# Patient Record
Sex: FEMALE | Race: WHITE | Employment: FULL TIME | URBAN - METROPOLITAN AREA
[De-identification: names, ages, dates, MRNs, and addresses within clinical notes are randomized per-mention and may not be internally consistent; named-entity substitution may affect disease eponyms.]

---

## 2019-03-18 ENCOUNTER — OFFICE VISIT (OUTPATIENT)
Dept: HEMATOLOGY | Age: 49
End: 2019-03-18

## 2019-03-18 ENCOUNTER — HOSPITAL ENCOUNTER (OUTPATIENT)
Dept: LAB | Age: 49
Discharge: HOME | End: 2019-03-18
Payer: COMMERCIAL

## 2019-03-18 VITALS
HEIGHT: 62 IN | DIASTOLIC BLOOD PRESSURE: 79 MMHG | RESPIRATION RATE: 19 BRPM | SYSTOLIC BLOOD PRESSURE: 114 MMHG | OXYGEN SATURATION: 98 % | WEIGHT: 148 LBS | TEMPERATURE: 98.8 F | HEART RATE: 73 BPM | BODY MASS INDEX: 27.23 KG/M2

## 2019-03-18 DIAGNOSIS — K76.0 NAFLD (NONALCOHOLIC FATTY LIVER DISEASE): ICD-10-CM

## 2019-03-18 DIAGNOSIS — K76.0 NAFLD (NONALCOHOLIC FATTY LIVER DISEASE): Primary | ICD-10-CM

## 2019-03-18 PROBLEM — R25.1 OCCASIONAL TREMORS: Status: ACTIVE | Noted: 2019-03-18

## 2019-03-18 PROBLEM — E78.00 HYPERCHOLESTEROLEMIA: Status: ACTIVE | Noted: 2019-03-18

## 2019-03-18 PROBLEM — R00.0 TACHYCARDIA: Status: ACTIVE | Noted: 2019-03-18

## 2019-03-18 PROBLEM — Q21.12 PFO (PATENT FORAMEN OVALE): Status: ACTIVE | Noted: 2019-03-18

## 2019-03-18 LAB
ALBUMIN SERPL-MCNC: 4.4 G/DL (ref 3.4–5)
ALBUMIN/GLOB SERPL: 1.2 {RATIO} (ref 0.8–1.7)
ALP SERPL-CCNC: 108 U/L (ref 45–117)
ALT SERPL-CCNC: 53 U/L (ref 13–56)
ANION GAP SERPL CALC-SCNC: 6 MMOL/L (ref 3–18)
AST SERPL-CCNC: 34 U/L (ref 15–37)
BASOPHILS # BLD: 0 K/UL (ref 0–0.1)
BASOPHILS NFR BLD: 0 % (ref 0–2)
BILIRUB DIRECT SERPL-MCNC: 0.2 MG/DL (ref 0–0.2)
BILIRUB SERPL-MCNC: 0.7 MG/DL (ref 0.2–1)
BUN SERPL-MCNC: 14 MG/DL (ref 7–18)
BUN/CREAT SERPL: 22 (ref 12–20)
CALCIUM SERPL-MCNC: 9.5 MG/DL (ref 8.5–10.1)
CHLORIDE SERPL-SCNC: 104 MMOL/L (ref 100–108)
CO2 SERPL-SCNC: 30 MMOL/L (ref 21–32)
CREAT SERPL-MCNC: 0.63 MG/DL (ref 0.6–1.3)
DIFFERENTIAL METHOD BLD: ABNORMAL
EOSINOPHIL # BLD: 0.5 K/UL (ref 0–0.4)
EOSINOPHIL NFR BLD: 9 % (ref 0–5)
ERYTHROCYTE [DISTWIDTH] IN BLOOD BY AUTOMATED COUNT: 13.1 % (ref 11.6–14.5)
FERRITIN SERPL-MCNC: 237 NG/ML (ref 8–388)
GLOBULIN SER CALC-MCNC: 3.7 G/DL (ref 2–4)
GLUCOSE SERPL-MCNC: 110 MG/DL (ref 74–99)
HCT VFR BLD AUTO: 43.1 % (ref 35–45)
HGB BLD-MCNC: 13.7 G/DL (ref 12–16)
LYMPHOCYTES # BLD: 1.3 K/UL (ref 0.9–3.6)
LYMPHOCYTES NFR BLD: 25 % (ref 21–52)
MCH RBC QN AUTO: 29.8 PG (ref 24–34)
MCHC RBC AUTO-ENTMCNC: 31.8 G/DL (ref 31–37)
MCV RBC AUTO: 93.7 FL (ref 74–97)
MONOCYTES # BLD: 0.4 K/UL (ref 0.05–1.2)
MONOCYTES NFR BLD: 7 % (ref 3–10)
NEUTS SEG # BLD: 3.1 K/UL (ref 1.8–8)
NEUTS SEG NFR BLD: 59 % (ref 40–73)
PLATELET # BLD AUTO: 297 K/UL (ref 135–420)
PMV BLD AUTO: 10.9 FL (ref 9.2–11.8)
POTASSIUM SERPL-SCNC: 4.4 MMOL/L (ref 3.5–5.5)
PROT SERPL-MCNC: 8.1 G/DL (ref 6.4–8.2)
RBC # BLD AUTO: 4.6 M/UL (ref 4.2–5.3)
SODIUM SERPL-SCNC: 140 MMOL/L (ref 136–145)
WBC # BLD AUTO: 5.3 K/UL (ref 4.6–13.2)

## 2019-03-18 PROCEDURE — 81256 HFE GENE: CPT

## 2019-03-18 PROCEDURE — 85025 COMPLETE CBC W/AUTO DIFF WBC: CPT

## 2019-03-18 PROCEDURE — 80048 BASIC METABOLIC PNL TOTAL CA: CPT

## 2019-03-18 PROCEDURE — 36415 COLL VENOUS BLD VENIPUNCTURE: CPT

## 2019-03-18 PROCEDURE — 82728 ASSAY OF FERRITIN: CPT

## 2019-03-18 PROCEDURE — 80076 HEPATIC FUNCTION PANEL: CPT

## 2019-03-18 RX ORDER — FLUTICASONE PROPIONATE 50 MCG
2 SPRAY, SUSPENSION (ML) NASAL DAILY
COMMUNITY

## 2019-03-18 RX ORDER — BISOPROLOL FUMARATE 10 MG/1
10 TABLET ORAL DAILY
COMMUNITY

## 2019-03-18 RX ORDER — BISMUTH SUBSALICYLATE 262 MG
1 TABLET,CHEWABLE ORAL DAILY
COMMUNITY

## 2019-03-18 RX ORDER — ATORVASTATIN CALCIUM 20 MG/1
40 TABLET, FILM COATED ORAL DAILY
COMMUNITY

## 2019-03-18 RX ORDER — ASPIRIN 81 MG/1
TABLET ORAL DAILY
COMMUNITY

## 2019-03-18 RX ORDER — AZELASTINE HCL 205.5 UG/1
SPRAY NASAL 2 TIMES DAILY
COMMUNITY

## 2019-03-18 NOTE — PROGRESS NOTES
[address], [place], [address], [place], [place]       AMANDA Lozoya PA-C [facility] Spikes, [facility]   AMANDA Loredo NP [address] [facility] [address]    at [address], [address] [place] [address], [facility] 22.    [phone]    FAX: [address] [place] [address]    at [address], [address], [address] [place] [address],[address]    [address] [place] [address] Street: [phone]           Patient Care Team:  Trevon Lopez MD as PCP - General (Internal Medicine)  Trevon Lopez MD as Consulting Provider (Internal Medicine)  Irving Mchugh MD as Consulting Provider (Cardiology)  Jose Lake MD as Consulting Provider (Obstetrics & Gynecology)  Tori Bosch MD as Consulting Provider      Problem List  Never Reviewed          Codes Class Noted    Fatty liver ICD-10-CM: K76.0  ICD-9-CM: 571.8  3/18/2019        Hypercholesterolemia ICD-10-CM: E78.00  ICD-9-CM: 272.0  3/18/2019        Tachycardia ICD-10-CM: R00.0  ICD-9-CM: 785.0  3/18/2019        PFO (patent foramen ovale) ICD-10-CM: Q21.1  ICD-9-CM: 745.5  3/18/2019        Occasional tremors ICD-10-CM: R25.1  ICD-9-CM: 781.0  3/18/2019                The clinicians listed above have asked me to see Nghia Dunn in consultation regarding suspected fatty liver disease and its management. All medical records sent by the referring physicians were reviewed including imaging studies and pathology. The patient is a 46 y.o.  female who is suspected to have fatty liver disease based upon ultrasound. Serologic evaluation for markers of chronic liver disease was positive for KRISTIE. Ferritin slightly elevated. The most recent imaging of the liver was with Ultrasound in 06/2018.  Results suggest fatty liver disease. No liver mass lesions noted. An assessment of liver fibrosis with biopsy or elastography has not been performed. The patient had not started any new medications within 3 months preceding the elevation in liver chemistries. The patient has no complaints which can be attributed to liver disease. The patient completes all daily activities without any functional limitations. The patient has not experienced fatigue, fevers, chills, shortness of breath, chest pain, pain in the right side over the liver, diffuse abdominal pain, nausea, vomiting, constipation, diarrhrea, dry eyes, dry mouth, arthralgias, myalgias, yellowing of the eyes or skin, itching, dark urine, problems concentrating, swelling of the abdomen, swelling of the lower extremities, hematemesis, or hematochezia. ASSESSMENT AND PLAN:  Fatty liver  The diagnosis is based upon imaging and serologic studies that are negative for other causes of chronic liver disease except a positive KRISTIE. The histologic severity has not been defined. If the patient looses 20% of current body weight, all steatosis will have resolved. Once all steatosis has resolved all inflammation will resolve. Then all fibrosis will gradually resolve and the liver could eventually be normal.    The most recent laboratory studies indicate that the liver transaminases are normal, alkaline phosphatase is normal, tests of hepatic synthetic and metabolic function are normal, and the platelet count is normal.  She did have a slightly elevated ferritin. Based upon laboratory studies and imaging, the patient does not appear to have significant liver injury. Will perform laboratory testing to monitor liver function and degree of liver injury. This included BMP, hepatic panel, CBC with platelet count. Serologic testing for causes of chronic liver disease were negative for HCV, HBV, ASMA, AMA. Positive KRISTIE.    Serologic testing was positive for KRISTIE and slightly elevated ferritin. She has a positive KRISTIE. This is loosely associated with underlying autoimmune disease. However, there is no elevation in liver chemistries, so if this is autoimmune, there is nothing to treat at this time. We will follow liver enzymes every 6 months for the next couple of years to assess for activation of autoimmune hepatitis. She reports that her father is [place] Because of this and the slight elevation in ferritin, a Hemochromatosis gene mutation test was ordered. She may be a carrier of the mutation. Will perform imaging of the liver with ultrasound. The need to perform an assessment of liver fibrosis was discussed with the patient. Elastography  can assess liver fibrosis and determine if a patient has advanced fibrosis or cirrhosis without the need for liver biopsy. This can also be performed with ultrasound. This will be performed along with the ultrasound. Only a liver biopsy can confirm if the patient does have fatty liver and differentiate NAFL from DREW. The treatment is the same: weight reduction. If the liver biopsy demonstrates DREW the patient could be eligible for enrollment into clinical trials for treatment of DREW. The need to perform a liver biopsy to help determine the cause and severity of the liver test abnormalities was discussed. The risks of performing the liver biopsy including pain, puncture of the lung, gallbladder, intestine or kidney and bleeding were discussed. Will defer liver biopsy for now. There is no reason to perform liver biopsy at this time. Liver chemistries will be monitored. If the liver enzymes remain persistently elevated over the next 1-2 years a liver biopsy should be performed to ensure there is no ongoing chronic liver disease. There is currently no FDA approved medical treatment for fatty liver, NALFD or DREW.     The patient was counseled regarding diet and exercise to achieve weight loss. The best diet for patients with fatty liver is one very low in carbohydrates and enriched with protein such as an Dianne's program.      The patient was told to consume any food products and drinks containing fructose as this enhances hepatic fat synthesis. There is no medication or vitamin supplements that we advocate for DREW. Using glitazones in patients without diabetes mellitus has been shown to reduce fat content in the liver but has no effect on fibrosis and is associated with weight gain. Vitamin E has also been used but the data is not very good and most experts no longer advocate this. We will continue to monitor the patient at periodic intervals. If weight loss is successful the fat will resolve from the liver and liver enzymes should return to the normal range. We would then repeat the ultrasound and Fibroscan to see if this also returns to normal.      If liver enzymes do not return to normal with weight reduction then additional evaluation may be necessary to determine if the elevated liver enzymes is due to another etiology. Screening for Hepatocellular Carcinoma  HCC screening is not necessary if the patient has no evidence of cirrhosis. AFP was ordered today and ultrasound will be scheduled. Treatment of other medical problems in patients with chronic liver disease  There are no contraindications for the patient to take most medications that are necessary for treatment of other medical issues. The patient can take the following medications as these will not impact the patient's current liver disease. Any medications utilized for treatment of DM  Statins to treat hypercholesterolemia    The patient does not comsume alcohol on a daily basis. Normal doses of acetaminophen, as recommended on the label of the bottle, are not hepatotoxic except in the setting of daily alcohol use, even in patients with cirrhosis and can be utilized for pain.     Counseling for alcohol in patients with chronic liver disease  The patient was counseled regarding alcohol consumption and the effect of alcohol on chronic liver disease. The patient does not consume any significant amount of alcohol. The patient was reminded that alcohol can cause fatty liver. The patient does not have a chronic liver disease and does not have to be abstinent from alcohol. Vaccinations   Vaccination for viral hepatitis A and B is recommended since the patient has no serologic evidence of previous exposure or vaccination with immunity. TwinRix prescription was provided today. Since the patient does not have a chronic liver disease which can lead to liver injury screening for HAV and HBV is not needed. Routine vaccinations against other bacterial and viral agents can be performed as indicated. Annual flu vaccination should be administered if indicated. ALLERGIES  Allergies   Allergen Reactions    Clinoril [Sulindac] Hives    Codeine Itching    Erythromycin Itching    Penicillin G Shortness of Breath    Sulfa (Sulfonamide Antibiotics) Nausea and Vomiting    Tramadol Nausea Only       MEDICATIONS  Current Outpatient Medications   Medication Sig    bisoprolol (ZEBETA) 10 mg tablet Take 10 mg by mouth daily.  atorvastatin (LIPITOR) 20 mg tablet Take  by mouth daily.  aspirin delayed-release 81 mg tablet Take  by mouth daily.  multivitamin (ONE A DAY) tablet Take 1 Tab by mouth daily.  azelastine (ASTEPRO) 0.15 % (205.5 mcg) two (2) times a day.  fluticasone propionate (FLONASE) 50 mcg/actuation nasal spray 2 Sprays by Both Nostrils route daily. No current facility-administered medications for this visit. SYSTEM REVIEW NOT RELATED TO LIVER DISEASE OR REVIEWED ABOVE:  Constitution systems: Negative for fever, chills, weight gain, weight loss. Eyes: Negative for visual changes. ENT: Negative for sore throat, painful swallowing.    Respiratory: Negative for cough, hemoptysis, SOB. Cardiology: Negative for chest pain, palpitations. GI:  Negative for constipation or diarrhea. : Negative for urinary frequency, dysuria, hematuria, nocturia. Skin: Negative for rash. Hematology: Negative for easy bruising, blood clots. Musculo-skelatal: Negative for back pain, muscle pain, weakness. Neurologic: Negative for headaches, dizziness, vertigo, memory problems not related to HE. Psychology: Negative for anxiety, depression. FAMILY HISTORY:  The patient is adopted and does not know any of his family history. The father  at 53 y/o from [address] [facility]. The mother is alive and has the following chronic diseases: heart disease. She is 81 y/o. There is no family history of liver disease. SOCIAL HISTORY:  The patient is . The patient has 1 child. The patient has never used tobacco products. The patient has never consumed significant amounts of alcohol. The patient consumes 2 to 3 alcoholic beverages per week. The patient currently works full time as [employer]. PHYSICAL EXAMINATION:  VS: per nursing note  General: No acute distress. Eyes: Sclera anicteric. ENT: No oral lesions. Thyroid normal.  Nodes: No adenopathy. Skin: No spider angiomata. No jaundice. No palmar erythema. Respiratory: Lungs clear to auscultation. Cardiovascular: Regular heart rate. No murmurs. No JVD. Abdomen: Soft non-tender, liver size normal to percussion/palpation. Spleen not palpable. No obvious ascites. Extremities: No edema. No muscle wasting. No gross arthritic changes. Neurologic: Alert and oriented. Cranial nerves grossly intact. No asterixis. LABORATORY STUDIES:  From 2019  AST/ ALT/ ALP/ T. BILI/ ALB:  26/ 30/ 100/ 0.7/ 4.8     SEROLOGIES:  From 2019:  AMA (-), ASMA (-), RA (-).   KRISTIE (+)    LIVER HISTOLOGY:  Not available or performed    ENDOSCOPIC PROCEDURES:  Not available or performed    RADIOLOGY:  Not available or performed    OTHER TESTING:  Not available or performed    FOLLOW-UP:  All of the issues listed above in the Assessment and Plan were discussed with the patient. All questions were answered. The patient expressed a clear understanding of the above. [address] [facility] in 6 months.          THERESE Harrison  Liver [place] [address], [address] [place] [address]   [phone]

## 2019-03-18 NOTE — PROGRESS NOTES
1. Have you been to the ER, urgent care clinic since your last visit? Hospitalized since your last visit? No    2. Have you seen or consulted any other health care providers outside of the [address] [place] [address] since your last visit? Include any pap smears or colon screening.  Yes, Dr. Shanice Andino

## 2019-03-21 LAB — HFE GENE MUT ANL BLD/T: NORMAL

## 2019-03-22 ENCOUNTER — TELEPHONE (OUTPATIENT)
Dept: HEMATOLOGY | Age: 49
End: 2019-03-22

## 2019-03-22 NOTE — TELEPHONE ENCOUNTER
Updated on current labs. All are WNL. 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 Follow up as scheduled.

## 2019-03-28 ENCOUNTER — HOSPITAL ENCOUNTER (OUTPATIENT)
Dept: ULTRASOUND IMAGING | Age: 49
Discharge: HOME | End: 2019-03-28
Payer: COMMERCIAL

## 2019-03-28 DIAGNOSIS — K76.0 NAFLD (NONALCOHOLIC FATTY LIVER DISEASE): ICD-10-CM

## 2019-03-28 PROCEDURE — 76981 USE PARENCHYMA: CPT

## 2022-02-16 ENCOUNTER — ANESTHESIA (OUTPATIENT)
Dept: SURGERY | Age: 52
DRG: 514 | End: 2022-02-16
Payer: COMMERCIAL

## 2022-02-16 ENCOUNTER — ANESTHESIA EVENT (OUTPATIENT)
Dept: SURGERY | Age: 52
DRG: 514 | End: 2022-02-16
Payer: COMMERCIAL

## 2022-02-16 ENCOUNTER — HOSPITAL ENCOUNTER (INPATIENT)
Age: 52
LOS: 1 days | Discharge: HOME | DRG: 514 | End: 2022-02-17
Attending: ORTHOPAEDIC SURGERY | Admitting: ORTHOPAEDIC SURGERY
Payer: COMMERCIAL

## 2022-02-16 PROBLEM — M00.9 SEPTIC ARTHRITIS OF HAND, RIGHT (HCC): Status: ACTIVE | Noted: 2022-02-16

## 2022-02-16 LAB
ALBUMIN SERPL-MCNC: 4.2 G/DL (ref 3.4–5)
ALBUMIN/GLOB SERPL: 1.2 {RATIO} (ref 0.8–1.7)
ALP SERPL-CCNC: 93 U/L (ref 45–117)
ALT SERPL-CCNC: 45 U/L (ref 13–56)
ANION GAP SERPL CALC-SCNC: 5 MMOL/L (ref 3–18)
AST SERPL-CCNC: 27 U/L (ref 10–38)
BASOPHILS # BLD: 0.1 K/UL (ref 0–0.1)
BASOPHILS NFR BLD: 1 % (ref 0–2)
BILIRUB SERPL-MCNC: 1.1 MG/DL (ref 0.2–1)
BUN SERPL-MCNC: 11 MG/DL (ref 7–18)
BUN/CREAT SERPL: 17 (ref 12–20)
CALCIUM SERPL-MCNC: 10.2 MG/DL (ref 8.5–10.1)
CHLORIDE SERPL-SCNC: 108 MMOL/L (ref 100–111)
CO2 SERPL-SCNC: 29 MMOL/L (ref 21–32)
COVID-19 RAPID TEST, COVR: NOT DETECTED
CREAT SERPL-MCNC: 0.65 MG/DL (ref 0.6–1.3)
DIFFERENTIAL METHOD BLD: NORMAL
EOSINOPHIL # BLD: 0.4 K/UL (ref 0–0.4)
EOSINOPHIL NFR BLD: 5 % (ref 0–5)
ERYTHROCYTE [DISTWIDTH] IN BLOOD BY AUTOMATED COUNT: 11.9 % (ref 11.6–14.5)
GLOBULIN SER CALC-MCNC: 3.4 G/DL (ref 2–4)
GLUCOSE SERPL-MCNC: 93 MG/DL (ref 74–99)
HCG UR QL: NEGATIVE
HCT VFR BLD AUTO: 43.6 % (ref 35–45)
HGB BLD-MCNC: 14.2 G/DL (ref 12–16)
IMM GRANULOCYTES # BLD AUTO: 0 K/UL (ref 0–0.04)
IMM GRANULOCYTES NFR BLD AUTO: 0 % (ref 0–0.5)
LYMPHOCYTES # BLD: 3.1 K/UL (ref 0.9–3.6)
LYMPHOCYTES NFR BLD: 33 % (ref 21–52)
MCH RBC QN AUTO: 29.4 PG (ref 24–34)
MCHC RBC AUTO-ENTMCNC: 32.6 G/DL (ref 31–37)
MCV RBC AUTO: 90.3 FL (ref 78–100)
MONOCYTES # BLD: 0.8 K/UL (ref 0.05–1.2)
MONOCYTES NFR BLD: 8 % (ref 3–10)
NEUTS SEG # BLD: 4.9 K/UL (ref 1.8–8)
NEUTS SEG NFR BLD: 53 % (ref 40–73)
NRBC # BLD: 0 K/UL (ref 0–0.01)
NRBC BLD-RTO: 0 PER 100 WBC
PLATELET # BLD AUTO: 271 K/UL (ref 135–420)
PMV BLD AUTO: 10.7 FL (ref 9.2–11.8)
POTASSIUM SERPL-SCNC: 5 MMOL/L (ref 3.5–5.5)
PROT SERPL-MCNC: 7.6 G/DL (ref 6.4–8.2)
RBC # BLD AUTO: 4.83 M/UL (ref 4.2–5.3)
SODIUM SERPL-SCNC: 142 MMOL/L (ref 136–145)
SOURCE, COVRS: NORMAL
WBC # BLD AUTO: 9.2 K/UL (ref 4.6–13.2)

## 2022-02-16 PROCEDURE — 2709999900 HC NON-CHARGEABLE SUPPLY: Performed by: ORTHOPAEDIC SURGERY

## 2022-02-16 PROCEDURE — 80053 COMPREHEN METABOLIC PANEL: CPT

## 2022-02-16 PROCEDURE — 36415 COLL VENOUS BLD VENIPUNCTURE: CPT

## 2022-02-16 PROCEDURE — 87635 SARS-COV-2 COVID-19 AMP PRB: CPT

## 2022-02-16 PROCEDURE — 77030000032 HC CUF TRNQT ZIMM -B: Performed by: ORTHOPAEDIC SURGERY

## 2022-02-16 PROCEDURE — 77030020782 HC GWN BAIR PAWS FLX 3M -B: Performed by: ORTHOPAEDIC SURGERY

## 2022-02-16 PROCEDURE — 76060000032 HC ANESTHESIA 0.5 TO 1 HR: Performed by: ORTHOPAEDIC SURGERY

## 2022-02-16 PROCEDURE — 74011250636 HC RX REV CODE- 250/636: Performed by: ANESTHESIOLOGY

## 2022-02-16 PROCEDURE — 77030002916 HC SUT ETHLN J&J -A: Performed by: ORTHOPAEDIC SURGERY

## 2022-02-16 PROCEDURE — 77030040361 HC SLV COMPR DVT MDII -B: Performed by: ORTHOPAEDIC SURGERY

## 2022-02-16 PROCEDURE — 74011000258 HC RX REV CODE- 258: Performed by: ORTHOPAEDIC SURGERY

## 2022-02-16 PROCEDURE — 76210000063 HC OR PH I REC FIRST 0.5 HR: Performed by: ORTHOPAEDIC SURGERY

## 2022-02-16 PROCEDURE — 87102 FUNGUS ISOLATION CULTURE: CPT

## 2022-02-16 PROCEDURE — 85025 COMPLETE CBC W/AUTO DIFF WBC: CPT

## 2022-02-16 PROCEDURE — 87075 CULTR BACTERIA EXCEPT BLOOD: CPT

## 2022-02-16 PROCEDURE — 76010000138 HC OR TIME 0.5 TO 1 HR: Performed by: ORTHOPAEDIC SURGERY

## 2022-02-16 PROCEDURE — 65270000029 HC RM PRIVATE

## 2022-02-16 PROCEDURE — 74011250636 HC RX REV CODE- 250/636: Performed by: ORTHOPAEDIC SURGERY

## 2022-02-16 PROCEDURE — 0L970ZZ DRAINAGE OF RIGHT HAND TENDON, OPEN APPROACH: ICD-10-PCS | Performed by: ORTHOPAEDIC SURGERY

## 2022-02-16 PROCEDURE — 87116 MYCOBACTERIA CULTURE: CPT

## 2022-02-16 PROCEDURE — 74011250637 HC RX REV CODE- 250/637: Performed by: ORTHOPAEDIC SURGERY

## 2022-02-16 PROCEDURE — 87205 SMEAR GRAM STAIN: CPT

## 2022-02-16 PROCEDURE — 74011000250 HC RX REV CODE- 250: Performed by: ANESTHESIOLOGY

## 2022-02-16 PROCEDURE — 87186 SC STD MICRODIL/AGAR DIL: CPT

## 2022-02-16 PROCEDURE — 0LD70ZZ EXTRACTION OF RIGHT HAND TENDON, OPEN APPROACH: ICD-10-PCS | Performed by: ORTHOPAEDIC SURGERY

## 2022-02-16 PROCEDURE — 74011000250 HC RX REV CODE- 250: Performed by: ORTHOPAEDIC SURGERY

## 2022-02-16 PROCEDURE — 77030027138 HC INCENT SPIROMETER -A

## 2022-02-16 PROCEDURE — 81025 URINE PREGNANCY TEST: CPT

## 2022-02-16 PROCEDURE — 87077 CULTURE AEROBIC IDENTIFY: CPT

## 2022-02-16 RX ORDER — ONDANSETRON 2 MG/ML
4 INJECTION INTRAMUSCULAR; INTRAVENOUS ONCE
Status: DISCONTINUED | OUTPATIENT
Start: 2022-02-16 | End: 2022-02-16 | Stop reason: HOSPADM

## 2022-02-16 RX ORDER — MAGNESIUM SULFATE 100 %
4 CRYSTALS MISCELLANEOUS AS NEEDED
Status: DISCONTINUED | OUTPATIENT
Start: 2022-02-16 | End: 2022-02-16 | Stop reason: HOSPADM

## 2022-02-16 RX ORDER — ATORVASTATIN CALCIUM 20 MG/1
20 TABLET, FILM COATED ORAL DAILY
Status: DISCONTINUED | OUTPATIENT
Start: 2022-02-17 | End: 2022-02-17

## 2022-02-16 RX ORDER — AZELASTINE HCL 205.5 UG/1
2 SPRAY NASAL 2 TIMES DAILY
Status: DISCONTINUED | OUTPATIENT
Start: 2022-02-17 | End: 2022-02-17 | Stop reason: HOSPADM

## 2022-02-16 RX ORDER — LIDOCAINE HYDROCHLORIDE 20 MG/ML
INJECTION, SOLUTION EPIDURAL; INFILTRATION; INTRACAUDAL; PERINEURAL AS NEEDED
Status: DISCONTINUED | OUTPATIENT
Start: 2022-02-16 | End: 2022-02-16 | Stop reason: HOSPADM

## 2022-02-16 RX ORDER — ACETAMINOPHEN 500 MG
1000 TABLET ORAL
Status: DISCONTINUED | OUTPATIENT
Start: 2022-02-16 | End: 2022-02-17 | Stop reason: HOSPADM

## 2022-02-16 RX ORDER — MIDAZOLAM HYDROCHLORIDE 1 MG/ML
INJECTION, SOLUTION INTRAMUSCULAR; INTRAVENOUS AS NEEDED
Status: DISCONTINUED | OUTPATIENT
Start: 2022-02-16 | End: 2022-02-16 | Stop reason: HOSPADM

## 2022-02-16 RX ORDER — SODIUM CHLORIDE 0.9 % (FLUSH) 0.9 %
5-40 SYRINGE (ML) INJECTION AS NEEDED
Status: DISCONTINUED | OUTPATIENT
Start: 2022-02-16 | End: 2022-02-16 | Stop reason: HOSPADM

## 2022-02-16 RX ORDER — SODIUM CHLORIDE 0.9 % (FLUSH) 0.9 %
5-40 SYRINGE (ML) INJECTION EVERY 8 HOURS
Status: DISCONTINUED | OUTPATIENT
Start: 2022-02-16 | End: 2022-02-16 | Stop reason: HOSPADM

## 2022-02-16 RX ORDER — LEVOFLOXACIN 5 MG/ML
500 INJECTION, SOLUTION INTRAVENOUS EVERY 24 HOURS
Status: DISCONTINUED | OUTPATIENT
Start: 2022-02-16 | End: 2022-02-17 | Stop reason: HOSPADM

## 2022-02-16 RX ORDER — PROPOFOL 10 MG/ML
INJECTION, EMULSION INTRAVENOUS AS NEEDED
Status: DISCONTINUED | OUTPATIENT
Start: 2022-02-16 | End: 2022-02-16 | Stop reason: HOSPADM

## 2022-02-16 RX ORDER — INSULIN LISPRO 100 [IU]/ML
INJECTION, SOLUTION INTRAVENOUS; SUBCUTANEOUS ONCE
Status: DISCONTINUED | OUTPATIENT
Start: 2022-02-16 | End: 2022-02-16 | Stop reason: HOSPADM

## 2022-02-16 RX ORDER — NALOXONE HYDROCHLORIDE 0.4 MG/ML
0.1 INJECTION, SOLUTION INTRAMUSCULAR; INTRAVENOUS; SUBCUTANEOUS AS NEEDED
Status: DISCONTINUED | OUTPATIENT
Start: 2022-02-16 | End: 2022-02-16 | Stop reason: HOSPADM

## 2022-02-16 RX ORDER — DEXTROSE MONOHYDRATE 100 MG/ML
0-250 INJECTION, SOLUTION INTRAVENOUS AS NEEDED
Status: DISCONTINUED | OUTPATIENT
Start: 2022-02-16 | End: 2022-02-16 | Stop reason: HOSPADM

## 2022-02-16 RX ORDER — OXYCODONE HYDROCHLORIDE 5 MG/1
5-10 TABLET ORAL
Status: DISCONTINUED | OUTPATIENT
Start: 2022-02-16 | End: 2022-02-17 | Stop reason: HOSPADM

## 2022-02-16 RX ORDER — FENTANYL CITRATE 50 UG/ML
25 INJECTION, SOLUTION INTRAMUSCULAR; INTRAVENOUS AS NEEDED
Status: DISCONTINUED | OUTPATIENT
Start: 2022-02-16 | End: 2022-02-16 | Stop reason: HOSPADM

## 2022-02-16 RX ORDER — ONDANSETRON 2 MG/ML
INJECTION INTRAMUSCULAR; INTRAVENOUS AS NEEDED
Status: DISCONTINUED | OUTPATIENT
Start: 2022-02-16 | End: 2022-02-16 | Stop reason: HOSPADM

## 2022-02-16 RX ORDER — METOPROLOL SUCCINATE 100 MG/1
100 TABLET, EXTENDED RELEASE ORAL DAILY
Status: DISCONTINUED | OUTPATIENT
Start: 2022-02-17 | End: 2022-02-17 | Stop reason: HOSPADM

## 2022-02-16 RX ORDER — SODIUM CHLORIDE, SODIUM LACTATE, POTASSIUM CHLORIDE, CALCIUM CHLORIDE 600; 310; 30; 20 MG/100ML; MG/100ML; MG/100ML; MG/100ML
100 INJECTION, SOLUTION INTRAVENOUS CONTINUOUS
Status: DISCONTINUED | OUTPATIENT
Start: 2022-02-16 | End: 2022-02-16 | Stop reason: HOSPADM

## 2022-02-16 RX ORDER — DOXYCYCLINE 100 MG/1
100 CAPSULE ORAL EVERY 12 HOURS
COMMUNITY
Start: 2022-02-15 | End: 2022-02-22

## 2022-02-16 RX ORDER — CLINDAMYCIN HYDROCHLORIDE 150 MG/1
300 CAPSULE ORAL EVERY 8 HOURS
COMMUNITY
Start: 2022-02-15 | End: 2022-02-22

## 2022-02-16 RX ORDER — SODIUM CHLORIDE, SODIUM LACTATE, POTASSIUM CHLORIDE, CALCIUM CHLORIDE 600; 310; 30; 20 MG/100ML; MG/100ML; MG/100ML; MG/100ML
125 INJECTION, SOLUTION INTRAVENOUS CONTINUOUS
Status: DISCONTINUED | OUTPATIENT
Start: 2022-02-16 | End: 2022-02-17 | Stop reason: HOSPADM

## 2022-02-16 RX ORDER — DIPHENHYDRAMINE HCL 25 MG
25 CAPSULE ORAL
Status: DISCONTINUED | OUTPATIENT
Start: 2022-02-16 | End: 2022-02-17 | Stop reason: HOSPADM

## 2022-02-16 RX ORDER — FENTANYL CITRATE 50 UG/ML
INJECTION, SOLUTION INTRAMUSCULAR; INTRAVENOUS AS NEEDED
Status: DISCONTINUED | OUTPATIENT
Start: 2022-02-16 | End: 2022-02-16 | Stop reason: HOSPADM

## 2022-02-16 RX ORDER — ONDANSETRON 4 MG/1
8 TABLET, ORALLY DISINTEGRATING ORAL
Status: DISCONTINUED | OUTPATIENT
Start: 2022-02-16 | End: 2022-02-17 | Stop reason: HOSPADM

## 2022-02-16 RX ADMIN — ONDANSETRON HYDROCHLORIDE 4 MG: 2 INJECTION INTRAMUSCULAR; INTRAVENOUS at 20:39

## 2022-02-16 RX ADMIN — CEFTRIAXONE SODIUM 2 G: 2 INJECTION, POWDER, FOR SOLUTION INTRAMUSCULAR; INTRAVENOUS at 20:26

## 2022-02-16 RX ADMIN — PROPOFOL 50 MG: 10 INJECTION, EMULSION INTRAVENOUS at 20:22

## 2022-02-16 RX ADMIN — MIDAZOLAM 2 MG: 1 INJECTION INTRAMUSCULAR; INTRAVENOUS at 19:51

## 2022-02-16 RX ADMIN — FENTANYL CITRATE 25 MCG: 50 INJECTION, SOLUTION INTRAMUSCULAR; INTRAVENOUS at 20:23

## 2022-02-16 RX ADMIN — PROPOFOL 50 MG: 10 INJECTION, EMULSION INTRAVENOUS at 20:27

## 2022-02-16 RX ADMIN — PROPOFOL 50 MG: 10 INJECTION, EMULSION INTRAVENOUS at 20:11

## 2022-02-16 RX ADMIN — SODIUM CHLORIDE, SODIUM LACTATE, POTASSIUM CHLORIDE, AND CALCIUM CHLORIDE: 600; 310; 30; 20 INJECTION, SOLUTION INTRAVENOUS at 20:35

## 2022-02-16 RX ADMIN — PROPOFOL 50 MG: 10 INJECTION, EMULSION INTRAVENOUS at 20:18

## 2022-02-16 RX ADMIN — FENTANYL CITRATE 25 MCG: 50 INJECTION, SOLUTION INTRAMUSCULAR; INTRAVENOUS at 20:18

## 2022-02-16 RX ADMIN — PROPOFOL 50 MG: 10 INJECTION, EMULSION INTRAVENOUS at 20:00

## 2022-02-16 RX ADMIN — FENTANYL CITRATE 50 MCG: 50 INJECTION, SOLUTION INTRAMUSCULAR; INTRAVENOUS at 19:56

## 2022-02-16 RX ADMIN — SODIUM CHLORIDE, SODIUM LACTATE, POTASSIUM CHLORIDE, AND CALCIUM CHLORIDE 125 ML/HR: 600; 310; 30; 20 INJECTION, SOLUTION INTRAVENOUS at 19:32

## 2022-02-16 RX ADMIN — SODIUM CHLORIDE, SODIUM LACTATE, POTASSIUM CHLORIDE, AND CALCIUM CHLORIDE 125 ML/HR: 600; 310; 30; 20 INJECTION, SOLUTION INTRAVENOUS at 16:55

## 2022-02-16 RX ADMIN — PROPOFOL 50 MG: 10 INJECTION, EMULSION INTRAVENOUS at 20:05

## 2022-02-16 RX ADMIN — LIDOCAINE HYDROCHLORIDE 60 MG: 20 INJECTION, SOLUTION INTRAVENOUS at 19:55

## 2022-02-16 RX ADMIN — LEVOFLOXACIN 500 MG: 5 INJECTION, SOLUTION INTRAVENOUS at 22:04

## 2022-02-16 RX ADMIN — ONDANSETRON 8 MG: 4 TABLET, ORALLY DISINTEGRATING ORAL at 22:03

## 2022-02-16 RX ADMIN — PROPOFOL 50 MG: 10 INJECTION, EMULSION INTRAVENOUS at 20:33

## 2022-02-16 NOTE — ANESTHESIA PREPROCEDURE EVALUATION
Relevant Problems   GASTROINTESTINAL   (+) Fatty liver       Anesthetic History     PONV          Review of Systems / Medical History  Patient summary reviewed, nursing notes reviewed and pertinent labs reviewed    Pulmonary  Within defined limits                 Neuro/Psych   Within defined limits           Cardiovascular    Hypertension: well controlled        Dysrhythmias : SVT  Hyperlipidemia    Exercise tolerance: >4 METS     GI/Hepatic/Renal           Liver disease (fatty)     Endo/Other  Within defined limits           Other Findings              Physical Exam    Airway  Mallampati: II  TM Distance: 4 - 6 cm  Neck ROM: normal range of motion   Mouth opening: Normal     Cardiovascular  Regular rate and rhythm,  S1 and S2 normal,  no murmur, click, rub, or gallop  Rhythm: regular  Rate: normal         Dental  No notable dental hx       Pulmonary  Breath sounds clear to auscultation               Abdominal  GI exam deferred       Other Findings            Anesthetic Plan    ASA: 2  Anesthesia type: MAC          Induction: Intravenous  Anesthetic plan and risks discussed with: Patient      Discussed both general anesthetic and regional with MAC. Patient accepts all.

## 2022-02-16 NOTE — PERIOP NOTES
Reviewed PTA medication list with patient/caregiver and patient/caregiver denies any additional medications. Patient admits to having a responsible adult care for them at home for at least 24 hours after surgery. Patient encouraged to use gown warming system and informed that using said warming gown to regulate body temperature prior to a procedure has been shown to help reduce the risks of blood clots and infection. Patient's pharmacy of choice verified and documented in PTA medication section. Dual skin assessment & fall risk band verification completed with Rodolfo Allen RN.

## 2022-02-17 VITALS
RESPIRATION RATE: 18 BRPM | OXYGEN SATURATION: 97 % | SYSTOLIC BLOOD PRESSURE: 132 MMHG | HEART RATE: 91 BPM | HEIGHT: 62 IN | TEMPERATURE: 98.6 F | WEIGHT: 153 LBS | DIASTOLIC BLOOD PRESSURE: 87 MMHG | BODY MASS INDEX: 28.16 KG/M2

## 2022-02-17 LAB
CRP SERPL HS-MCNC: 4.3 MG/L
ERYTHROCYTE [SEDIMENTATION RATE] IN BLOOD: 14 MM/HR (ref 0–30)

## 2022-02-17 PROCEDURE — 85652 RBC SED RATE AUTOMATED: CPT

## 2022-02-17 PROCEDURE — 74011250637 HC RX REV CODE- 250/637: Performed by: ORTHOPAEDIC SURGERY

## 2022-02-17 PROCEDURE — 36415 COLL VENOUS BLD VENIPUNCTURE: CPT

## 2022-02-17 PROCEDURE — 74011250637 HC RX REV CODE- 250/637: Performed by: INTERNAL MEDICINE

## 2022-02-17 PROCEDURE — 86141 C-REACTIVE PROTEIN HS: CPT

## 2022-02-17 RX ORDER — MOXIFLOXACIN HYDROCHLORIDE 400 MG/1
400 TABLET ORAL
Qty: 7 TABLET | Refills: 1 | Status: SHIPPED | OUTPATIENT
Start: 2022-02-17 | End: 2022-02-17 | Stop reason: SDUPTHER

## 2022-02-17 RX ORDER — ATORVASTATIN CALCIUM 20 MG/1
40 TABLET, FILM COATED ORAL DAILY
Status: DISCONTINUED | OUTPATIENT
Start: 2022-02-17 | End: 2022-02-17 | Stop reason: HOSPADM

## 2022-02-17 RX ORDER — LINEZOLID 600 MG/1
600 TABLET, FILM COATED ORAL EVERY 12 HOURS
Status: DISCONTINUED | OUTPATIENT
Start: 2022-02-17 | End: 2022-02-17 | Stop reason: HOSPADM

## 2022-02-17 RX ORDER — LINEZOLID 600 MG/1
600 TABLET, FILM COATED ORAL 2 TIMES DAILY
Qty: 14 TABLET | Refills: 1 | Status: SHIPPED | OUTPATIENT
Start: 2022-02-17

## 2022-02-17 RX ORDER — MOXIFLOXACIN HYDROCHLORIDE 400 MG/1
400 TABLET ORAL
Qty: 7 TABLET | Refills: 1 | Status: SHIPPED | OUTPATIENT
Start: 2022-02-17 | End: 2022-02-23 | Stop reason: SDUPTHER

## 2022-02-17 RX ORDER — LINEZOLID 600 MG/1
600 TABLET, FILM COATED ORAL 2 TIMES DAILY
Qty: 14 TABLET | Refills: 1 | Status: SHIPPED | OUTPATIENT
Start: 2022-02-17 | End: 2022-02-17 | Stop reason: SDUPTHER

## 2022-02-17 RX ADMIN — OXYCODONE 5 MG: 5 TABLET ORAL at 09:53

## 2022-02-17 RX ADMIN — LINEZOLID 600 MG: 600 TABLET, FILM COATED ORAL at 13:14

## 2022-02-17 RX ADMIN — ONDANSETRON 8 MG: 4 TABLET, ORALLY DISINTEGRATING ORAL at 09:55

## 2022-02-17 RX ADMIN — ATORVASTATIN CALCIUM 40 MG: 20 TABLET, FILM COATED ORAL at 09:53

## 2022-02-17 RX ADMIN — ACETAMINOPHEN 1000 MG: 500 TABLET ORAL at 08:42

## 2022-02-17 NOTE — ANESTHESIA POSTPROCEDURE EVALUATION
Post-Anesthesia Evaluation and Assessment    Cardiovascular Function/Vital Signs  Visit Vitals  /67   Pulse 75   Temp 36.6 °C (97.9 °F)   Resp 19   Ht 5' 2\" (1.575 m)   Wt 69.4 kg (153 lb)   SpO2 98%   BMI 27.98 kg/m²       Patient is status post Procedure(s):  RIGHT LONG EXPLORATION OF PENETRATING TRAUMA WITH DEBRIDEMENT FLEXOR TENDON RIGHT LONG ARTHROTOMY DISTAL INTERPHALANGEAL JOINT. Nausea/Vomiting: Controlled. Postoperative hydration reviewed and adequate. Pain:  Pain Scale 1: Numeric (0 - 10) (02/16/22 1613)  Pain Intensity 1: 7 (02/16/22 1613)   Managed. Neurological Status:   Neuro (WDL): Within Defined Limits (02/16/22 1714)   At baseline. Mental Status and Level of Consciousness: Arousable. Pulmonary Status:   O2 Device: Nasal cannula (02/16/22 2052)   Adequate oxygenation and airway patent. Complications related to anesthesia: None    Patient has met all discharge requirements.     Signed By: Katie Nj MD    February 16, 2022

## 2022-02-17 NOTE — PROGRESS NOTES
2158 - Patient arrives to unit at this time. Admission completed at this time. Patient is A/O x 4, RA. Denies chest pain and SOB. SCD compression device bilaterally. ACE dressing to right hand CDI. Denies numbness/tingling/calf pain. Pain 0/10 with a tolerable level of 5/10. Pt educated on routine of unit, IS use, and pain management. Pt verbalized understanding, no concerns voiced. Call bell within reach, bed in lowest position. Pt encouraged to call for assistance.

## 2022-02-17 NOTE — OP NOTES
[facility] [place]  OPERATIVE REPORT    Name:  Darshana Castro  MR#:   [id number]  :  1970  ACCOUNT #:  [de-identified]  DATE OF SERVICE:  2022    PREOPERATIVE DIAGNOSES:  1. Right long puncture injury. 2.  Right long early flexor tenosynovitis. 3.  Right long early distal interphalangeal joint septic arthritis. POSTOPERATIVE DIAGNOSES:  1. Right long cat bite puncture injury, volar ulnar pulp. 2.  Right long early developing flexor tenosynovitis A5 pulley region, distal extent A4 pulley. 3.  Right long presumed early distal interphalangeal joint septic arthritis. PROCEDURES PERFORMED:  1. Right long exploration of penetrating trauma. 2.  Right long incision and drainage of flexor tendon sheath. 3.  Right long arthrotomy of distal interphalangeal joint for debridement. SURGEON:  Surinder Gunn MD    ASSISTANT:  None. ANESTHESIA:  Monitored anesthesia care. ANESTHESIOLOGIST:  Dr. Giancarlo Cortes. COMPLICATIONS:  None. DRAINS:  None. SPECIMENS REMOVED:  1. Cultures, right long distal interphalangeal joint. 2.  Right long flexor tendon sheath. IMPLANTS:  None. ESTIMATED BLOOD LOSS:  Minimal.    INDICATIONS:  A 57-year-old female who sustained a puncture injury to the right long finger and was placed on clindamycin and doxycycline, presenting to the office with tenderness spanning from the puncture injury proximally to the level of the A3 pulley as well as dorsal tenderness and significant irritability on distal interphalangeal joint motion. The risks and benefits of operative intervention were discussed, and informed consent was obtained. FINDINGS:  Partial-thickness abrasion on the dorsal aspect of the right long finger, puncture wound, volar ulnar aspect of pulp tracking proximally obliquely to the region of the flexor digitorum profundus insertion at the A5 pulley, and apparent violation of the volar plate.   Notable synovial fluid in the distal interphalangeal joint. Tenosynovial fluid proliferation could be in the A5/distal aspect of the A4 pulley region. No evidence of purulent fluid. Mild turbulence to the distal interphalangeal joint and tenosynovial fluid. No evidence of tenosynovial fluid in the proximal fibro-osseous canal.    PROCEDURE:  The patient was identified as the patient in the preanesthesia area and the right long finger marked as the operative site. Preoperative antibiotics were withheld for intraoperative cultures. The patient was brought to the operating room in supine position and transferred to the operating room table. A well-padded tourniquet was applied to the right upper extremity, prepped and draped in usual sterile fashion with Hibiclens and alcohol. Operative time-out was taken. Peripheral nerve blockade established. Following the establishment of peripheral nerve blockade, attention was directed dorsally. Proposed dorsal incision was marked. Under loupe magnification, the skin was incised and full-thickness skin flaps elevated proximal and distal exposing the underlying terminal extensor. Arthrotomy was performed on the ulnar aspect of the distal interphalangeal joint and Turbulent synovial fluid was encountered. Cultures were obtained. Arthrotomy was performed on the radial aspect of the distal interphalangeal joint. Prolonged cleansing of the distal interphalangeal joint was performed with antibiotic-containing solution. The skin was closed with 4-0 nylon suture. Exploration of the volar penetrating trauma was undertaken. Proposed incision marked obliquely onto the volar pulp extending onto the ulnar mid lateral aspect. Under loupe magnification, the skin was incised, and soft tissue dissection carried down to the fibro-osseous canal with care to mobilize and protect the ulnar neurovascular bundle. Turbulent tenosynovial fluid was encountered/edema in the subcutaneous tissue, with cultures obtained. Dissection was carried down with disruption of the distal aspect of the A5 pulley. There was tenosynovial fluid proximally into the distal aspect of the A4 pulley. Cultures obtained throughout. Exploration of the flexor digitorum profundus insertion suggested puncture at the volar plate. The flexor digitorum profundus was otherwise intact. Proximal incision was marked. Under loupe magnification, the skin was incised and soft tissue dissection was carried down to the fibro-osseous canal.  No evidence of turbulent tenosynovial fluid was noted in the proximal aspect of the fibro-osseous canal.  Incision and drainage of the tendon sheath was performed with a 14-gauge angiocath irrigating the tendon sheath with antibiotic-containing solution. Following catheter irrigation, the skin was closed with 4-0 nylon suture. During closure, the patient was administered with 2 g of ceftriaxone. She tolerated the antibiotic without evidence of adverse reaction. DISPOSITION:  Hospital admission, IV antibiotics. Infectious Disease consultation.       Surendra Todd MD      [id number]  D:  02/16/2022 21:00  T:  02/17/2022 2:53  JOB #:  [id number]  CC:  [address] [place] [address]

## 2022-02-17 NOTE — DISCHARGE INSTRUCTIONS
DISCHARGE SUMMARY from Nurse    PATIENT INSTRUCTIONS:    After general anesthesia or intravenous sedation, for 24 hours or while taking prescription Narcotics:  · Limit your activities  · Do not drive and operate hazardous machinery  · Do not make important personal or business decisions  · Do  not drink alcoholic beverages  · If you have not urinated within 8 hours after discharge, please contact your surgeon on call. Report the following to your surgeon:  · Excessive pain, swelling, redness or odor of or around the surgical area  · Temperature over 100.5  · Nausea and vomiting lasting longer than 4 hours or if unable to take medications  · Any signs of decreased circulation or nerve impairment to extremity: change in color, persistent  numbness, tingling, coldness or increase pain  · Any questions    What to do at Home:  Recommended activity: Activity as tolerated; Elevate RIGHT Arm; May resume Prior Diet; If you experience any of the ABOVE symptoms, please follow up with SURGEON. *  Please give a list of your current medications to your Primary Care Provider. *  Please update this list whenever your medications are discontinued, doses are      changed, or new medications (including over-the-counter products) are added. *  Please carry medication information at all times in case of emergency situations. These are general instructions for a healthy lifestyle:    No smoking/ No tobacco products/ Avoid exposure to second hand smoke  Surgeon General's Warning:  Quitting smoking now greatly reduces serious risk to your health.     Obesity, smoking, and sedentary lifestyle greatly increases your risk for illness    A healthy diet, regular physical exercise & weight monitoring are important for maintaining a healthy lifestyle    You may be retaining fluid if you have a history of heart failure or if you experience any of the following symptoms:  Weight gain of 3 pounds or more overnight or 5 pounds in a week, increased swelling in our hands or feet or shortness of breath while lying flat in bed. Please call your doctor as soon as you notice any of these symptoms; do not wait until your next office visit. The discharge information has been reviewed with the patient. The patient verbalized understanding. Discharge medications reviewed with the patient and appropriate educational materials and side effects teaching were provided. ___________________________________________________________________________________________________________________________________    Preventing Infection at Home  We care about preventing infection and avoiding the spread of germs - not only when you are in the hospital but also when you return home. When you return home from the hospital, its important to take the following steps to help prevent infection and avoid spreading germs that could infect you and others. Ask everyone in your home to follow these guidelines, too. Clean Your Hands  · Clean your hands whenever your hands are visibly dirty, before you eat, before or after touching your mouth, nose or eyes, and before preparing food. Clean them after contact with body fluids, using the restroom, touching animals or changing diapers. · When washing hands, wet them with warm water and work up a lather. Rub hands for at least 15 seconds, then rinse them and pat them dry with a clean towel or paper towel. · When using hand sanitizers, it should take about 15 seconds to rub your hands dry. If not, you probably didnt apply enough . Cover Your Sneeze or Cough  Germs are released into the air whenever you sneeze or cough. To prevent the spread of infection:  · Turn away from other people before coughing or sneezing. · Cover your mouth or nose with a tissue when you cough or sneeze. Put the tissue in the trash. · If you dont have a tissue, cough or sneeze into your upper sleeve, not your hands.   · Always clean your hands after coughing or sneezing. Care for Wounds  Your skin is your bodys first line of defense against germs, but an open wound leaves an easy way for germs to enter your body. To prevent infection:  · Clean your hands before and after changing wound dressings, and wear gloves to change dressings if recommended by your doctor. · Take special care with IV lines or other devices inserted into the body. If you must touch them, clean your hands first.  · Follow any specific instructions from your doctor to care for your wounds. Contact your doctor if you experience any signs of infection, such as fever or increased redness at the surgical or wound site. Keep a Clean Home  · Clean or wipe commonly touched hard surfaces like door handles, sinks, tabletops, phones and TV remotes. · Use products labeled disinfectant to kill harmful bacteria and viruses. · Use a clean cloth or paper towel to clean and dry surfaces. Wiping surfaces with a dirty dishcloth, sponge or towel will only spread germs. · Never share toothbrushes, rao, drinking glasses, utensils, razor blades, face cloths or bath towels to avoid spreading germs. · Be sure that the linens that you sleep on are clean. · Keep pets away from wounds and wash your hands after touching pets, their toys or bedding. We care about you and your health. Remember, preventing infections is a team effort between you, your family, friends and health care providers.

## 2022-02-17 NOTE — PROGRESS NOTES
Transition of Care (THERON) Plan:          Pt admitted for an elective right hand surgical procedure. Pt is independent, has spouse and family for home support, ID seen pt and will be discharging on PO abx therapy,  office arranging outpatient hand therapy which cms called and verified  Please encourage ambulation. No transition of care needs identified at this time. Anticipate pt will be medically stable for discharge within the next 24-48 hours with physician follow up. CM available to assist as needed. THERON Transportation:   How is patient being transported at discharge? Family/Friend      When? Once cleared by physician     Is transport scheduled? N/A      Follow-up appointment and transportation:   PCP/Specialist?  See AVS for Appointment         Who is transporting to the follow-up appointment? Self/Family/Friend      Is transport for follow up appointment scheduled? N/A    Communication plan (with patient/family): Who is being called? Patient or Next of Kin? Responsible party? Patient      What number(s) is to be used? See Facesheet      What service provider is calling for [facility] services? When are they calling? Readmission Risk? (Green/Low; Yellow/Moderate; Red/High):  Edgar here to complete [place] including selection of the Healthcare Decision Maker Relationship (ie \"Primary\")  Care Management Interventions  PCP Verified by CM: Yes  Palliative Care Criteria Met (RRAT>21 & CHF Dx)?: No  Mode of Transport at Discharge:  Other (see comment) (spouse)  Physical Therapy Consult: No  Occupational Therapy Consult: No  Support Systems: Spouse/Significant Other  Confirm Follow Up Transport: Family  The Plan for Transition of Care is Related to the Following Treatment Goals : Home with family assistance  Discharge Location  Patient Expects to be Discharged to[de-identified] Home

## 2022-02-17 NOTE — PROGRESS NOTES
Discharge instructions reviewed with the patient and . Patient verbalized understanding and verified by teach back. All questions answered. IV discontinued, no redness, swelling or pain noted. Patient ready for transportation home, with an ETA of 5 min. Patient discharged off the unit via wheelchair. Patient armband removed and shredded.

## 2022-02-17 NOTE — BRIEF OP NOTE
Brief Postoperative Note    Patient: Briana Hawk  YOB: 1970  MRN: [id number]    Date of Procedure: 2/16/2022     Pre-Op Diagnosis: PUNCTURE WOUND WITH OUT FOREIGN RIGHT INTERPHALANGEAL JOINT    Post-Op Diagnosis: right long cat bite with developing flexor tenosynovitis and septic arthritis      Procedure(s):  RIGHT LONG EXPLORATION OF PENETRATING TRAUMA WITH DEBRIDEMENT FLEXOR TENDON RIGHT LONG ARTHROTOMY DISTAL INTERPHALANGEAL JOINT  **LATE ADD ON - NO COVID TESTING DONE**    Surgeon(s):  Cary Sweeney MD    Surgical Assistant: None    Anesthesia: General     Estimated Blood Loss (mL): Minimal    Complications: None    Specimens:   ID Type Source Tests Collected by Time Destination   1 : RIGHT LONG D.I.P.  JOINT  Wound Incision CULTURE, ANAEROBIC, CULTURE, WOUND W Griselda Marinas, MD 2/16/2022 2016 Microbiology   2 : FLEXOR TENDON RIGHT LONG FINGER  Wound Incision CULTURE, ANAEROBIC, CULTURE, WOUND W GRAM STAIN, CULTURE & SMEAR, AFB, CULTURE, Dat Ashley MD 2/16/2022 2021 Microbiology        Implants: * No implants in log *    Drains: * No LDAs found *    Findings: developing tenosynovitis involving A5 distal A4 with developing septic arthritis    Electronically Signed by Davonna Aschoff, MD on 2/16/2022 at 8:42 PM

## 2022-02-17 NOTE — ROUTINE PROCESS
Bedside and Verbal shift change report given to SANJIV Melissa RN by Anabela Pedraza RN. Report included the following information SBAR, Kardex, Intake/Output and MAR.

## 2022-02-17 NOTE — PERIOP NOTES
TRANSFER - OUT REPORT:    Verbal report given to Nguyễn Lemon RN (name) on Kareem Wilson  being transferred to 3 S (unit) for routine progression of care       Report consisted of patients Situation, Background, Assessment and   Recommendations(SBAR). Information from the following report(s) OR Summary, Procedure Summary, Intake/Output and MAR was reviewed with the receiving nurse. Lines:   Peripheral IV 02/16/22 Anterior; Left Forearm (Active)   Site Assessment Clean, dry, & intact 02/16/22 2117   Phlebitis Assessment 0 02/16/22 2117   Infiltration Assessment 0 02/16/22 2117   Dressing Status Clean, dry, & intact 02/16/22 2117   Dressing Type Transparent;Tape 02/16/22 2117   Hub Color/Line Status Infusing 02/16/22 2117        Opportunity for questions and clarification was provided.       Patient transported with:   O2 @ 2 liters  Registered Nurse

## 2022-02-17 NOTE — PROGRESS NOTES
0834-Calling MD to get order changed for lipitor from 20 to 40 mg.    0838-MD said hold metoprolol for today and update dose of Lipitor.

## 2022-02-17 NOTE — PERIOP NOTES
[facility] made aware that SBAR is ready for review. Patient assigned room #308.  Alvaro Do will be the nurse

## 2022-02-17 NOTE — ROUTINE PROCESS
TRANSFER - IN REPORT:    Verbal report received from CHRIS Yap(name) on Fredrick Fleming  being received from PACU for routine post - op. Report consisted of patients Situation, Background, Assessment and   Recommendations(SBAR). Information from the following report(s) SBAR, Kardex, OR Summary, Intake/Output and MAR was reviewed with the receiving nurse. Opportunity for questions and clarification was provided. Assessment to be completed upon patients arrival to unit and care assumed.

## 2022-02-17 NOTE — CONSULTS
[facility]  (A Division of [address] [place] [address])                                                                                                                      Samantha Trujillo MD  Office #: - Option # [phone]  Fax #: [phone]     Date of Admission: 2/16/2022Date of Note: 2/17/2022      Reason for Consult: Evaluation and antibiotic management of cat bite-- right hand, by Dr Adan Gómez. Thank you for involving me in the care of this patient. Please do not hesitate to contact me on the above number if question or concern. Current Antimicrobials:    Prior Antimicrobials:    Levofloxacin PO  Ceftriaxone 2gm iv    Immunosuppressive drugs: Doxycycline PO POA  Clindamycin POP X2 doases POA       Assessment- ID related:  --------------------------------------------------------------------------    · Cat Bite on R  Hand- 2/14/22  ==PO Clinda + doxy as OP X1 day  · Possible tendon/joint infection- 3rd finger R  · S/P I and D -- OR culture pending. OR Note reviewed  · PCN allergy, tolerated Ceftriaxone X1    Other Medical Issues- Mx per respective team:    · Fatty liver  · Tachycardia  · Hyperlipidemia   Recommendation for ID issues I am following:  ------------------------------------------------------------------------------    Common pathogen such as GNR Pasturella need to be covered as well as skin organism - staph. DW pt emperic ABX treatment- PO Vs IV  Moxifloxacin + Linezolid PO would be equally effective oral option, and can streamline further depending on Cx result and pt progress . She agrees on plan, orders placed    Weekly cbc w/diff/bmp on Mondays    FU with me next week- to review cx       HPI:  Yady Leo is a 46 y.o. WHITE/NON- with PMH as listed above, had a bite on R hand by her cat( fully vaccinated cat) , as was trying to give it medication.  She went to see PCP On 2/15- after 1 day of bite- was given doxy/Clindamycin and referred to Dr Kaykay Demarco, who brought her in and I and D done last night. Gram stain/cultures pending. Denies nausea, vomiting, abdominal pain, diarrhea, rash. No dysuria/hematuria. Works as  in [place]. Active Hospital Problems    Diagnosis Date Noted    Septic arthritis of hand, right ([address] 02/16/2022     Past Medical History:   Diagnosis Date    Heart murmur     Hypertension     Nausea after anesthesia     Tachycardia     Tremors of nervous system      Past Surgical History:   Procedure Laterality Date    HX ANKLE FRACTURE TX Right 2014    HX COLONOSCOPY      HX CYST REMOVAL Right 2019    HX SHOULDER ARTHROSCOPY Left 2000     History reviewed. No pertinent family history. Social History     Socioeconomic History    Marital status:      Spouse name: Not on file    Number of children: Not on file    Years of education: Not on file    Highest education level: Not on file   Occupational History    Not on file   Tobacco Use    Smoking status: Never Smoker    Smokeless tobacco: Never Used   Substance and Sexual Activity    Alcohol use: Yes     Alcohol/week: 3.0 - 4.0 standard drinks     Types: 1 Shots of liquor, 2 - 3 Cans of beer per week    Drug use: No    Sexual activity: Not on file   Other Topics Concern    Not on file   Social History Narrative    Not on file     Social Determinants of Health     Financial Resource Strain:     Difficulty of Paying Living Expenses: Not on file   Food Insecurity:     Worried About Running Out of Food in the Last Year: Not on file    Lulu of Food in the Last Year: Not on file   Transportation Needs:     Lack of Transportation (Medical): Not on file    Lack of Transportation (Non-Medical):  Not on file   Physical Activity:     Days of Exercise per Week: Not on file    Minutes of Exercise per Session: Not on file   Stress:     Feeling of Stress : Not on file   Social Connections:     Frequency of Communication with Friends and Family: Not on file    Frequency of Social Gatherings with Friends and Family: Not on file    Attends Taoism Services: Not on file    Active Member of Clubs or Organizations: Not on file    Attends Club or Organization Meetings: Not on file    Marital Status: Not on file   Intimate Partner Violence:     Fear of Current or Ex-Partner: Not on file    Emotionally Abused: Not on file    Physically Abused: Not on file    Sexually Abused: Not on file   Housing Stability:     Unable to Pay for Housing in the Last Year: Not on file    Number of Places Lived in the Last Year: Not on file    Unstable Housing in the Last Year: Not on file       Allergies:  Clinoril [sulindac], Codeine, Erythromycin, Penicillin g, Sulfa (sulfonamide antibiotics), and Tramadol     Medications:  Current Facility-Administered Medications   Medication Dose Route Frequency    atorvastatin (LIPITOR) tablet 40 mg  40 mg Oral DAILY    lactated Ringers infusion  125 mL/hr IntraVENous CONTINUOUS    levoFLOXacin (LEVAQUIN) 500 mg in D5W IVPB  500 mg IntraVENous Q24H    oxyCODONE IR (ROXICODONE) tablet 5-10 mg  5-10 mg Oral Q4H PRN    acetaminophen (TYLENOL) tablet 1,000 mg  1,000 mg Oral Q8H PRN    ondansetron (ZOFRAN ODT) tablet 8 mg  8 mg Oral Q8H PRN    diphenhydrAMINE (BENADRYL) capsule 25 mg  25 mg Oral Q6H PRN    metoprolol succinate (TOPROL-XL) tablet 100 mg  100 mg Oral DAILY    azelastine (ASTEPRO) 205.5 mcg (0.15 %) nasal spray 2 Spray (Patient Supplied)  2 Spray Both Nostrils BID    cefTRIAXone (ROCEPHIN) 2 g in 0.9% sodium chloride (MBP/ADV) 50 mL MBP  2 g IntraVENous Q24H        ROS:  Pertinent items are noted in the History of Present Illness.            Physical Exam:    Temp (24hrs), Av.4 °F (36.9 °C), Min:97.9 °F (36.6 °C), Max:98.9 °F (37.2 °C)    Visit Vitals  BP (!) 86/58   Pulse 83   Temp 98.5 °F (36.9 °C)   Resp 18   Ht 5' 2\" (1.575 m)   Wt 69.4 kg (153 lb)   LMP 2018 SpO2 98%   BMI 27.98 kg/m²          GEN: WDWN, not in resp distress. HEENT: Unicteric. EOMI intact  CHEST: Non laboured breathing. cTA  CVS:RRR, no mur/gallop. ABD: Obese/soft. Non tender. SHIKHA: Deferred  EXT: No apparent swelling or redness on UE/LE joints. Skin: Dry and intact. No rash, no redness. --right hand dressed post surgically  CNS: A, OX3. Moves all extremity. CN grossly ok. Microbiology  All Micro Results     Procedure Component Value Units Date/Time    CULTURE, Yvetta Citron STAIN [[id number]] Collected: 02/16/22 2015    Order Status: Completed Specimen: Finger Updated: 02/17/22 1135     Special Requests: NO SPECIAL REQUESTS        GRAM STAIN NO WBC'S SEEN         NO DEFINITE ORGANISM SEEN        Culture result: PENDING    CULTURE, Yvetta Citron STAIN [[id number]] Collected: 02/16/22 2021    Order Status: Completed Specimen: Wound from Finger Updated: 02/17/22 1134     Special Requests: NO SPECIAL REQUESTS        GRAM STAIN NO WBC'S SEEN         NO DEFINITE ORGANISM SEEN        Culture result: PENDING    CULTURE, FUNGUS [[id number]] Collected: 02/16/22 2021    Order Status: Completed Specimen: Wound Updated: 02/17/22 1014    CULTURE, ANAEROBIC [[id number]] Collected: 02/16/22 2021    Order Status: Completed Specimen: Wound Drainage Updated: 02/17/22 1014    CULTURE, ANAEROBIC [[id number]] Collected: 02/16/22 2015    Order Status: Completed Updated: 02/17/22 1014    AFB CULTURE + SMEAR W/RFLX ID FROM CULTURE [[id number]] Collected: 02/16/22 2021    Order Status: Completed Updated: 02/16/22 2120    COVID-19 RAPID TEST [[id number]] Collected: 02/16/22 1618    Order Status: Completed Specimen: Nasopharyngeal Updated: 02/16/22 1656     Specimen source Nasopharyngeal        COVID-19 rapid test Not detected        Comment: Rapid Abbott ID Now       Rapid NAAT:  The specimen is NEGATIVE for SARS-CoV-2, the novel coronavirus associated with COVID-19.        Negative results should be treated as presumptive and, if inconsistent with clinical signs and symptoms or necessary for patient management, should be tested with an alternative molecular assay. Negative results do not preclude SARS-CoV-2 infection and should not be used as the sole basis for patient management decisions. This test has been authorized by the FDA under an Emergency Use Authorization (EUA) for use by authorized laboratories.    Fact sheet for Healthcare Providers: ConventionUpdate.co.nz  Fact sheet for Patients: ConventionUpdate.co.nz       Methodology: Isothermal Nucleic Acid Amplification                Lab results:    Chemistry  Recent Labs     02/16/22  1925   GLU 93      K 5.0      CO2 29   BUN 11   CREA 0.65   CA 10.2*   AGAP 5   BUCR 17   AP 93   TP 7.6   ALB 4.2   GLOB 3.4   AGRAT 1.2       CBC w/ Diff  Recent Labs     02/16/22  1700   WBC 9.2   RBC 4.83   HGB 14.2   HCT 43.6      GRANS 53   LYMPH 33   EOS 5       Imaging:     Imaging: report reviewed and as posted by radiologist

## 2022-02-21 LAB
ABSOLUTE LYMPHOCYTE COUNT, 10803: 2.4 K/UL (ref 1–4.8)
ANION GAP SERPL CALC-SCNC: 14 MMOL/L (ref 3–15)
BACTERIA SPEC CULT: NORMAL
BASOPHILS # BLD: 0 K/UL (ref 0–0.2)
BASOPHILS NFR BLD: 0 % (ref 0–2)
BUN SERPL-MCNC: 12 MG/DL (ref 6–22)
CALCIUM SERPL-MCNC: 9.8 MG/DL (ref 8.4–10.5)
CHLORIDE SERPL-SCNC: 102 MMOL/L (ref 98–110)
CO2 SERPL-SCNC: 26 MMOL/L (ref 20–32)
CREAT SERPL-MCNC: 0.7 MG/DL (ref 0.5–1.2)
EOSINOPHIL # BLD: 0.5 K/UL (ref 0–0.5)
EOSINOPHIL NFR BLD: 5 % (ref 0–6)
ERYTHROCYTE [DISTWIDTH] IN BLOOD BY AUTOMATED COUNT: 11.9 % (ref 10–15.5)
GFRAA, 66117: >60
GFRNA, 66118: >60
GLUCOSE SERPL-MCNC: 156 MG/DL (ref 70–99)
GRAM STN SPEC: NORMAL
GRAM STN SPEC: NORMAL
GRANULOCYTES,GRANS: 62 % (ref 40–75)
HCT VFR BLD AUTO: 43.2 % (ref 35.1–48)
HGB BLD-MCNC: 14.2 G/DL (ref 11.7–16)
LYMPHOCYTES, LYMLT: 27 % (ref 20–45)
MCH RBC QN AUTO: 30 PG (ref 26–34)
MCHC RBC AUTO-ENTMCNC: 33 G/DL (ref 31–36)
MCV RBC AUTO: 92 FL (ref 80–99)
MONOCYTES # BLD: 0.5 K/UL (ref 0.1–1)
MONOCYTES NFR BLD: 6 % (ref 3–12)
NEUTROPHILS # BLD AUTO: 5.4 K/UL (ref 1.8–7.7)
PLATELET # BLD AUTO: 281 K/UL (ref 140–440)
PMV BLD AUTO: 10.9 FL (ref 9–13)
POTASSIUM SERPL-SCNC: 4.5 MMOL/L (ref 3.5–5.5)
RBC # BLD AUTO: 4.71 M/UL (ref 3.8–5.2)
SERVICE CMNT-IMP: NORMAL
SODIUM SERPL-SCNC: 142 MMOL/L (ref 133–145)
WBC # BLD AUTO: 8.7 K/UL (ref 4–11)

## 2022-02-22 LAB
BACTERIA SPEC CULT: ABNORMAL
GRAM STN SPEC: ABNORMAL
GRAM STN SPEC: ABNORMAL
SERVICE CMNT-IMP: ABNORMAL

## 2022-02-22 NOTE — PROGRESS NOTES
Physician Progress Note      PATIENT:               Jovani Chavez  CSN #:                  [id number]  :                       1970  ADMIT DATE:       2022 3:43 PM  [address] [place] DATE:        2022 5:19 PM  RESPONDING  PROVIDER #:        Cecile Jimenez MD          QUERY TEXT:    Patient admitted with puncture wound right interphalangeal joint. Noted documentation of debridement of distal right long flexor tendon in op note and  incision drainage of right long flexor tendon in op note . If possible, please document in progress notes and discharge summary if you are evaluating and /or treating any of the following: The medical record reflects the following:  Risk Factors: 45 yo female with history of cat bite developing flexor tendosynovitis and septic arthritis    Clinical Indicators: Per operative report Procedure: .  Right long incision and drainage of flexor tendon sheath    Per operative note Right long arthrotomy of distal interphalangeal joint for debridement    Treatment: IV antibiotics, Orthopedic surgical procedure right hand      Thank you,  Venancio Figueroa RN, [email or web address]  [phone]  Options provided:  -- Excisional debridement of right long flexor tendon sheath confirmed and Incisional and drainage of right flexor tendon ruled out  -- Both excisional debridement and incision and drainage of right long flexor tendon sheath confirmed  -- Other - I will add my own diagnosis  -- Disagree - Not applicable / Not valid  -- Disagree - Clinically unable to determine / Unknown  -- Refer to Clinical Documentation Reviewer    PROVIDER RESPONSE TEXT:    Provider is clinically unable to determine a response to this query.     Query created by: Anuj Fuentes on 2022 11:15 AM      Electronically signed by:  Cecile Jimenez MD 2022 12:50 PM

## 2022-02-23 ENCOUNTER — HOSPITAL ENCOUNTER (OUTPATIENT)
Dept: WOUND CARE | Age: 52
Discharge: HOME | End: 2022-02-23
Attending: INTERNAL MEDICINE
Payer: COMMERCIAL

## 2022-02-23 VITALS
RESPIRATION RATE: 16 BRPM | OXYGEN SATURATION: 100 % | HEART RATE: 77 BPM | SYSTOLIC BLOOD PRESSURE: 111 MMHG | DIASTOLIC BLOOD PRESSURE: 78 MMHG | TEMPERATURE: 98.7 F

## 2022-02-23 PROCEDURE — 99211 OFF/OP EST MAY X REQ PHY/QHP: CPT

## 2022-02-23 RX ORDER — MOXIFLOXACIN HYDROCHLORIDE 400 MG/1
400 TABLET ORAL DAILY
Qty: 14 TABLET | Refills: 0 | Status: SHIPPED | OUTPATIENT
Start: 2022-03-02

## 2022-02-23 RX ORDER — MOXIFLOXACIN HYDROCHLORIDE 400 MG/1
400 TABLET ORAL
Qty: 14 TABLET | Refills: 1 | Status: SHIPPED | OUTPATIENT
Start: 2022-02-23

## 2022-02-23 NOTE — DISCHARGE SUMMARY
[facility] SUMMARY    Name:  Jeffry Ricks  MR#:   [id number]  :  1970  ACCOUNT #:  [de-identified]  ADMIT DATE:  2022  DISCHARGE DATE:  2022    HISTORY OF PRESENT ILLNESS:  A 61-year-old right-hand-dominant female who sustained a cat bite puncture wound to the right long finger on 02/15. She was seen and evaluated in the office, with concern for developing right arm distal interphalangeal joint septic arthritis and concomitant right long developing flexor tenosynovitis. She was brought to the operating room for operative intervention including arthrotomy and debridement of the distal interphalangeal joint and right long exploration of penetrating trauma, with incision and drainage to the right long flexor tendon sheath. Intraoperative cultures were obtained, which subsequently grew Staph aureus, sensitive to oxacillin. She was seen and evaluated by Dr. Licha Gomez of Infectious Disease, and discharged on a home antibiotic regimen with outpatient followup for wound care and hand therapy. The patient's vital signs remained stable during hospitalization and she was agreeable to the time of the discharge. She was discharged home with followup as an outpatient.       MD МАРИНА [email or web address]  D:  2022 12:54  T:  2022 20:38  JOB #:  [id number]  CC:  [address] [place] [address]

## 2022-02-23 NOTE — PROGRESS NOTES
[facility]  (A Division of [address] [place] [address])    Infectious Alice Sandoval MD  Office #: - Option # [phone]  Fax #: [phone]     Date of  Clinic FU: 2/23/2022  Reason for Consult: Evaluation and antibiotic management of cat bite-- right hand, by Dr Lucille Gomez. Hand Surgery: Dr Lucille Gomez      Current Antimicrobials:    Prior Antimicrobials:    moxiflox- 2/17 + Linezolid 2/17 to date  End of ABX X4 weeks: march 16     Doxycycline PO POA  Clindamycin POP X2 doases POA  Levofloxacin PO  Ceftriaxone 2gm iv       Assessment- ID related:  --------------------------------------------------------------------------    · Cat Bite on R  Hand- 2/14/22  ==PO Clinda + doxy as OP X1 day  · Possible tendon/joint infection- 3rd finger R  · S/P I and D -- OR culture + for MSSA. -- OR Note reviewed  · PCN allergy, tolerated Ceftriaxone X1    Other Medical Issues- Mx per respective team:    · Fatty liver- LFT nl  · Tachycardia  · Hyperlipidemia   Recommendation for ID issues I am following:  ------------------------------------------------------------------------------    MSSA SS to FQ grew on OR culture  No Pasturella growth, but can be treated with Moxifloxaxin    --DC Linezolid  --cont Moxifloxacin 1 tab daily X4 weeks- weekly lab. LFT added due to her concern with her liver X 1 next week    Can call Next week to review labs and update me on her progress    Clinic FU PRN. Subjective:  Last seen in \[facility]\"", post hand surgery on 2/17- here today with   Went home on oral ABX X2 types  Feels upset with stomach, raegan in the morning. Otherwise no marked N/V/diarrhea. No fever  Hand wound is clsoed, stiches still in place. Pictures reviewed    Notes and labs reviewed. Imaging reports reviewed       HPI:  Oniel Foreman is a 46 y.o. WHITE/NON- with PMH as listed above, had a bite on R hand by her cat( fully vaccinated cat) , as was trying to give it medication. She went to see PCP On 2/15- after 1 day of bite- was given doxy/Clindamycin and referred to Dr Sara Borrego, who brought her in and I and D done last night. Gram stain/cultures pending. Denies nausea, vomiting, abdominal pain, diarrhea, rash. No dysuria/hematuria. Works as  in [place]. There are no active hospital problems to display for this patient. Past Medical History:   Diagnosis Date    Heart murmur     Hypertension     Nausea after anesthesia     Tachycardia     Tremors of nervous system      Past Surgical History:   Procedure Laterality Date    HX ANKLE FRACTURE TX Right 2014    HX COLONOSCOPY      HX CYST REMOVAL Right 2019    HX SHOULDER ARTHROSCOPY Left 2000     No family history on file. Social History     Socioeconomic History    Marital status:      Spouse name: Not on file    Number of children: Not on file    Years of education: Not on file    Highest education level: Not on file   Occupational History    Not on file   Tobacco Use    Smoking status: Never Smoker    Smokeless tobacco: Never Used   Substance and Sexual Activity    Alcohol use: Yes     Alcohol/week: 3.0 - 4.0 standard drinks     Types: 1 Shots of liquor, 2 - 3 Cans of beer per week    Drug use: No    Sexual activity: Not on file   Other Topics Concern    Not on file   Social History Narrative    Not on file     Social Determinants of Health     Financial Resource Strain:     Difficulty of Paying Living Expenses: Not on file   Food Insecurity:     Worried About Running Out of Food in the Last Year: Not on file    Lulu of Food in the Last Year: Not on file   Transportation Needs:     Lack of Transportation (Medical): Not on file    Lack of Transportation (Non-Medical):  Not on file   Physical Activity:     Days of Exercise per Week: Not on file    Minutes of Exercise per Session: Not on file   Stress:     Feeling of Stress : Not on file   Social Connections:     Frequency of Communication with Friends and Family: Not on file    Frequency of Social Gatherings with Friends and Family: Not on file    Attends Yazidism Services: Not on file    Active Member of [address] [place] [address] or Organizations: Not on file    Attends Club or Organization Meetings: Not on file    Marital Status: Not on file   Intimate Partner Violence:     Fear of Current or Ex-Partner: Not on file    Emotionally Abused: Not on file    Physically Abused: Not on file    Sexually Abused: Not on file   Housing Stability:     Unable to Pay for Housing in the Last Year: Not on file    Number of [place] in the Last Year: Not on file    Unstable Housing in the Last Year: Not on file       Allergies:  Clinoril [sulindac], Codeine, Erythromycin, Penicillin g, Sulfa (sulfonamide antibiotics), and Tramadol     Medications:  Current Outpatient Medications   Medication Sig Dispense    linezolid (ZYVOX) 600 mg tablet Take 1 Tablet by mouth two (2) times a day. 14 Tablet    moxifloxacin (AVELOX) 400 mg tablet Take 1 Tablet by mouth nightly. 7 Tablet    bisoprolol (ZEBETA) 10 mg tablet Take 10 mg by mouth daily.  atorvastatin (LIPITOR) 20 mg tablet Take 40 mg by mouth daily.  aspirin delayed-release 81 mg tablet Take  by mouth daily. (Patient not taking: Reported on 2022)     multivitamin (ONE A DAY) tablet Take 1 Tab by mouth daily.  azelastine (ASTEPRO) 0.15 % (205.5 mcg) two (2) times a day.  fluticasone propionate (FLONASE) 50 mcg/actuation nasal spray 2 Sprays by Both Nostrils route daily. No current facility-administered medications for this encounter. Physical Exam:    Temp (24hrs), Av.7 °F (37.1 °C), Min:98.7 °F (37.1 °C), Max:98.7 °F (37.1 °C)    Visit Vitals  /78 (BP 1 Location: Left upper arm, BP Patient Position:  At rest;Sitting)   Pulse 77   Temp 98.7 °F (37.1 °C)   Resp 16   LMP 02/16/2018   SpO2 100%          GEN: WDWN, not in resp distress. Ambulates fine    HEENT: Unicteric. EOMI intact  CHEST: Non laboured breathing. ABD: Obese/soft. Non tender. SHIKHA: Deferred  EXT: No apparent swelling or redness on UE/LE joints. Skin: Dry and intact. No rash, no redness. --right hand dressed post surgically- dry. Pictures of finger and hand reviewed on her smart phone  CNS: A, OX3. Moves all extremity. CN grossly ok.       Microbiology  All Micro Results     None           Lab results:    Chemistry  Recent Labs     02/21/22  0900   *      K 4.5      CO2 26   BUN 12   CREA 0.7   CA 9.8   AGAP 14.0       CBC w/ Diff  Recent Labs     02/21/22  0900   WBC 8.7   RBC 4.71   HGB 14.2   HCT 43.2      GRANS 62   EOS 5     Imaging: report reviewed and as posted by radiologist

## 2022-02-24 NOTE — PROGRESS NOTES
Physician Progress Note      PATIENT:               Rao Bowden  CSN #:                  [id number]  :                       1970  ADMIT DATE:       2022 3:43 PM  [address] DATE:        2022 5:19 PM  RESPONDING  PROVIDER #:        Steffany Awad MD          QUERY TEXT:    Patient admitted with puncture injury to right long finger. Per Op note dated 22, the patient underwent a R long Incision and drainage of flexor tendon sheath  as well as a R long arthrotomy of distal interphalangeal joint for debridement. List of Specimens removed included R long flexor tendon sheath. To accurately reflect the procedure performed please document if debridement was excisional or nonexcisional and the deepest depth of tissue removed as down to and including: The medical record reflects the following:  Risk Factors: cat bite puncture injury to R long finger  Clinical Indicators: Per the OP note on : the patient had Right long cat bite puncture injury, volar ulnar pulp. Right long early developing flexor tenosynovitis A5 pulley region, distal extent A4 pulley. 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 The procedures performed included:  Right long incision and drainage of flexor tendon sheath, Right long arthrotomy of distal interphalangeal joint for debridement  Further description of procedure states: Arthrotomy was performed on the radial aspect of the distal interphalangeal joint. Prolonged cleansing of the distal interphalangeal joint was performed with antibiotic-containing solution. The skin was closed with 4-0 nylon suture.   Treatment: OR procedure performed and specimens removed    Frederick Levy RN, BSN, [address] [place] [address]  [phone]  Options provided:  -- Nonexcisional debridement of R long flexor tendon sheath  -- Excisional debridement of R long flexor tendon  -- Other - I will add my own diagnosis  -- Disagree - Not applicable / Not valid  -- Disagree - Clinically unable to determine / Unknown  -- Refer to Clinical Documentation Reviewer    PROVIDER RESPONSE TEXT:    Non-excisional debridement of R long flexor tendon sheath was performed during procedure on 2/16/22.     Query created by: Roseline Sanders on 2/22/2022 4:03 PM      Electronically signed by:  Jenny Pro MD 2/24/2022 2:57 PM

## 2022-02-28 LAB
A-G RATIO,AGRAT: 2.2 RATIO (ref 1.1–2.6)
ABSOLUTE LYMPHOCYTE COUNT, 10803: 2.5 K/UL (ref 1–4.8)
ALBUMIN SERPL-MCNC: 4.9 G/DL (ref 3.5–5)
ALP SERPL-CCNC: 86 U/L (ref 25–115)
ALT SERPL-CCNC: 33 U/L (ref 5–40)
ANION GAP SERPL CALC-SCNC: 14 MMOL/L (ref 3–15)
AST SERPL W P-5'-P-CCNC: 26 U/L (ref 10–37)
BASOPHILS # BLD: 0 K/UL (ref 0–0.2)
BASOPHILS NFR BLD: 1 % (ref 0–2)
BILIRUB SERPL-MCNC: 0.6 MG/DL (ref 0.2–1.2)
BILIRUBIN, DIRECT,CBIL: <0.2 MG/DL (ref 0–0.3)
BUN SERPL-MCNC: 9 MG/DL (ref 6–22)
CALCIUM SERPL-MCNC: 10 MG/DL (ref 8.4–10.5)
CHLORIDE SERPL-SCNC: 104 MMOL/L (ref 98–110)
CO2 SERPL-SCNC: 25 MMOL/L (ref 20–32)
CREAT SERPL-MCNC: 0.6 MG/DL (ref 0.5–1.2)
EOSINOPHIL # BLD: 0.4 K/UL (ref 0–0.5)
EOSINOPHIL NFR BLD: 5 % (ref 0–6)
ERYTHROCYTE [DISTWIDTH] IN BLOOD BY AUTOMATED COUNT: 12.3 % (ref 10–15.5)
GFRAA, 66117: >60
GFRNA, 66118: >60
GLOBULIN,GLOB: 2.2 G/DL (ref 2–4)
GLUCOSE SERPL-MCNC: 122 MG/DL (ref 70–99)
GRANULOCYTES,GRANS: 57 % (ref 40–75)
HCT VFR BLD AUTO: 41.4 % (ref 35.1–48)
HGB BLD-MCNC: 13.4 G/DL (ref 11.7–16)
LYMPHOCYTES, LYMLT: 30 % (ref 20–45)
MCH RBC QN AUTO: 30 PG (ref 26–34)
MCHC RBC AUTO-ENTMCNC: 32 G/DL (ref 31–36)
MCV RBC AUTO: 93 FL (ref 80–99)
MONOCYTES # BLD: 0.7 K/UL (ref 0.1–1)
MONOCYTES NFR BLD: 8 % (ref 3–12)
NEUTROPHILS # BLD AUTO: 4.7 K/UL (ref 1.8–7.7)
PLATELET # BLD AUTO: 261 K/UL (ref 140–440)
PMV BLD AUTO: 10.7 FL (ref 9–13)
POTASSIUM SERPL-SCNC: 4.6 MMOL/L (ref 3.5–5.5)
PROT SERPL-MCNC: 7.1 G/DL (ref 6.4–8.3)
RBC # BLD AUTO: 4.44 M/UL (ref 3.8–5.2)
SODIUM SERPL-SCNC: 143 MMOL/L (ref 133–145)
WBC # BLD AUTO: 8.3 K/UL (ref 4–11)

## 2022-03-14 LAB
ABSOLUTE LYMPHOCYTE COUNT, 10803: 2.3 K/UL (ref 1–4.8)
ANION GAP SERPL CALC-SCNC: 10 MMOL/L (ref 3–15)
BASOPHILS # BLD: 0 K/UL (ref 0–0.2)
BASOPHILS NFR BLD: 1 % (ref 0–2)
BUN SERPL-MCNC: 10 MG/DL (ref 6–22)
CALCIUM SERPL-MCNC: 10 MG/DL (ref 8.4–10.5)
CHLORIDE SERPL-SCNC: 104 MMOL/L (ref 98–110)
CO2 SERPL-SCNC: 27 MMOL/L (ref 20–32)
CREAT SERPL-MCNC: 0.5 MG/DL (ref 0.5–1.2)
EOSINOPHIL # BLD: 0.4 K/UL (ref 0–0.5)
EOSINOPHIL NFR BLD: 5 % (ref 0–6)
ERYTHROCYTE [DISTWIDTH] IN BLOOD BY AUTOMATED COUNT: 12.5 % (ref 10–15.5)
GFRAA, 66117: >60
GFRNA, 66118: >60
GLUCOSE SERPL-MCNC: 118 MG/DL (ref 70–99)
GRANULOCYTES,GRANS: 59 % (ref 40–75)
HCT VFR BLD AUTO: 41.2 % (ref 35.1–48)
HGB BLD-MCNC: 13.8 G/DL (ref 11.7–16)
LYMPHOCYTES, LYMLT: 28 % (ref 20–45)
MCH RBC QN AUTO: 31 PG (ref 26–34)
MCHC RBC AUTO-ENTMCNC: 34 G/DL (ref 31–36)
MCV RBC AUTO: 92 FL (ref 80–99)
MONOCYTES # BLD: 0.6 K/UL (ref 0.1–1)
MONOCYTES NFR BLD: 8 % (ref 3–12)
NEUTROPHILS # BLD AUTO: 4.7 K/UL (ref 1.8–7.7)
PLATELET # BLD AUTO: 254 K/UL (ref 140–440)
PMV BLD AUTO: 10.7 FL (ref 9–13)
POTASSIUM SERPL-SCNC: 4.8 MMOL/L (ref 3.5–5.5)
RBC # BLD AUTO: 4.46 M/UL (ref 3.8–5.2)
SODIUM SERPL-SCNC: 141 MMOL/L (ref 133–145)
WBC # BLD AUTO: 8 K/UL (ref 4–11)

## 2022-03-21 LAB
BACTERIA SPEC CULT: NORMAL
SERVICE CMNT-IMP: NORMAL

## 2022-04-02 LAB
ACID FAST STN SPEC: NEGATIVE
MYCOBACTERIUM SPEC QL CULT: NEGATIVE
SPECIMEN PREPARATION: NORMAL
SPECIMEN SOURCE: NORMAL

## (undated) DEVICE — ZIMMER® STERILE DISPOSABLE TOURNIQUET CUFF WITH PROTECTIVE SLEEVE AND PLC, SINGLE PORT, SINGLE BLADDER, 18 IN. (46 CM)

## (undated) DEVICE — BANDAGE,GAUZE,CONFORMING,2"X75",STRL,LF: Brand: MEDLINE INDUSTRIES, INC.

## (undated) DEVICE — SOL IRRIGATION INJ NACL 0.9% 500ML BTL

## (undated) DEVICE — CATH IV AUTOGRD ORN 14GA 45MM -- INSYTE-N

## (undated) DEVICE — STERILE POLYISOPRENE POWDER-FREE SURGICAL GLOVES: Brand: PROTEXIS

## (undated) DEVICE — BANDAGE COMPR W3INXL5YD BGE POLY COT E RECOVERABLE BRTH W/

## (undated) DEVICE — BANDAGE,GAUZE,BULKEE II,4.5"X4.1YD,STRL: Brand: MEDLINE

## (undated) DEVICE — PREP SKN CHLRAPRP APL 26ML STR --

## (undated) DEVICE — SWAB CULT SGL AMIES W/O CHAR FOR THRT VAG SKIN HRT CULTSWAB

## (undated) DEVICE — GARMENT,MEDLINE,DVT,INT,CALF,MED, GEN2: Brand: MEDLINE

## (undated) DEVICE — GOWN,AURORA,NONRNF,XL,30/CS: Brand: MEDLINE

## (undated) DEVICE — UNDERCAST PADDING: Brand: DEROYAL

## (undated) DEVICE — SWAB CULT LIQ STUART AGR AERB MOD IN BRK SGL RAYON TIP PLAS 220099] BECTON DICKINSON MICRO]

## (undated) DEVICE — GLOVE ORANGE PI 8   MSG9080

## (undated) DEVICE — SUT ETHLN 3-0 18IN PS2 BLK --

## (undated) DEVICE — PACK PROCEDURE SURG EXTREMITY CUST

## (undated) DEVICE — NEEDLE HYPO 25GA L1.5IN BLU POLYPR HUB S STL REG BVL STR